# Patient Record
(demographics unavailable — no encounter records)

---

## 2025-04-03 NOTE — HISTORY OF PRESENT ILLNESS
[FreeTextEntry1] : 4/3/25 Pt seen for f/u. She is currently taking Topamax 50 mg daily for headaches. She states that it was working well but for the past few months she is now having increased headaches, about 2-3 weekly. She is taking rizatriptan for rescue and states that it workss great. Denies focal deficits, new vision changes.  Patient states she is getting increased numbness, tingling, pain in her right hand, specifically 4th and 5th finger, radiating up her elbow. She is right handed.  Her arm wakes her up at night. 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 She has had this previously but states it has worsened.  Patient states that she is getting numbness in both her toes. She states she is also getting a painful, burning sensation in her left thigh/groin that radiates down her leg. She endorses low back pain. She has tried conservative treatment previously as well as OTC NSAIDs but states that nothing has helped and it is impacting her greatly as she is a runner, and it is worse when she runs.   Patient is following with Dr. Hdz for her eye complaints in [facility].  She is having a hysterectomy next month   Presenting HPI: This is a 49 y/o RH woman who is being seen in neurologic consultation for headaches and diplopia.  Headache Age of onset: 30s   Location entire right side of head; never on the left  Description Unilateral  Quality- severe pain (cant even touch hair) Cant concentrate or focus during Nausea or vomiting can occur sometimes, not consistent Light or sound sensitivity- not always Rarely lacrimation but usually nose gets stuffy worse with lying down  Warning/aura (premonitory phase)- no warning; can often wake up with it  Postdrome: cognitive slowing, fatigue and neck hurts   Nocturnal awakening- no Associated with exertion or Valsalva- no   Once headache starts- duration: With medication: days but not as severe (Advil and Tylenol can make it bearable) Without medication: can last days   Average number of headache days per week: prior to Topamax daily headaches, now twice a month  Association with menstrual cycle- not sure  Contraceptive use- none  Triggers Headaches worsen in the cold  No consistent food or caffeine trigger   Sleep pattern: 9-10 pm and wakes up at 5 am ([place]- door to door 1 h 10 min)  Water intake: 96 oz   Caffeine intake: rare one cup of coffee   Anxiety/depression/stress: anxiety (not sure if well controlled)  Family History of headaches: none  Personal history of head trauma: none  Preventatives tried: Topiramate Rescue tried: Sumatriptan (made feel terrible due to nausea)  At present she is on Topiramate and pleased with the results. Has mild word recall difficulty. 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 Akins was normal.  Quit smoking two years ago

## 2025-04-03 NOTE — HISTORY OF PRESENT ILLNESS
[FreeTextEntry1] : 4/3/25 Pt seen for f/u. She is currently taking Topamax 50 mg daily for headaches. She states that it was working well but for the past few months she is now having increased headaches, about 2-3 weekly. She is taking rizatriptan for rescue and states that it workss great. Denies focal deficits, new vision changes.  Patient states she is getting increased numbness, tingling, pain in her right hand, specifically 4th and 5th finger, radiating up her elbow. She is right handed.  Her arm wakes her up at night. 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 She has had this previously but states it has worsened.  Patient states that she is getting numbness in both her toes. She states she is also getting a painful, burning sensation in her left thigh/groin that radiates down her leg. She endorses low back pain. She has tried conservative treatment previously as well as OTC NSAIDs but states that nothing has helped and it is impacting her greatly as she is a runner, and it is worse when she runs.   Patient is following with Dr. Hdz for her eye complaints in [facility].  She is having a hysterectomy next month   Presenting HPI: This is a 47 y/o RH woman who is being seen in neurologic consultation for headaches and diplopia.  Headache Age of onset: 30s   Location entire right side of head; never on the left  Description Unilateral  Quality- severe pain (cant even touch hair) Cant concentrate or focus during Nausea or vomiting can occur sometimes, not consistent Light or sound sensitivity- not always Rarely lacrimation but usually nose gets stuffy worse with lying down  Warning/aura (premonitory phase)- no warning; can often wake up with it  Postdrome: cognitive slowing, fatigue and neck hurts   Nocturnal awakening- no Associated with exertion or Valsalva- no   Once headache starts- duration: With medication: days but not as severe (Advil and Tylenol can make it bearable) Without medication: can last days   Average number of headache days per week: prior to Topamax daily headaches, now twice a month  Association with menstrual cycle- not sure  Contraceptive use- none  Triggers Headaches worsen in the cold  No consistent food or caffeine trigger   Sleep pattern: 9-10 pm and wakes up at 5 am ([place] city so [place]- door to door 1 h 10 min)  Water intake: 96 oz   Caffeine intake: rare one cup of coffee   Anxiety/depression/stress: anxiety (not sure if well controlled)  Family History of headaches: none  Personal history of head trauma: none  Preventatives tried: Topiramate Rescue tried: Sumatriptan (made feel terrible due to nausea)  At present she is on Topiramate and pleased with the results. Has mild word recall difficulty. 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 Akins was normal.  Quit smoking two years ago

## 2025-04-03 NOTE — PHYSICAL EXAM
[FreeTextEntry1] : Physical examination  General: No acute distress, Awake, Alert.   Mental status  Awake, alert, and oriented to person, time and place, Normal attention span and concentration, Recent and remote memory intact, Language intact, Fund of knowledge intact.  Cranial Nerves  II: VFF  III, IV, VI: PERRL, EOMI.  V: Facial sensation is normal B/L.  VII: Facial strength is normal B/L.  VIII: Gross hearing is intact.  IX, X: Palate is midline and elevates symmetrically.  XI: Trapezius normal strength.  XII: Tongue midline without atrophy or fasciculations.   Motor exam  Muscle tone - no evidence of rigidity or resistance in all 4 extremities.  No atrophy or fasciculations  Muscle Strength: arms and legs, proximal and distal flexors and extensors are normal  No UE drift.   Sensation Intact sensation to light touch, vibration and cold.   Reflexes  All present, normal, and symmetrical.  Plantars right: mute.  Plantars left: mute.   Coordination  Finger to nose: Normal.  Heel to shin: Normal.   Gait  Normal

## 2025-04-03 NOTE — ASSESSMENT
[FreeTextEntry1] : Increase Topiramate to 50 mg BID Side effects include, but are not limited to, decreased appetite, weight loss, numbness and tingling in the extremities, increased risk of kidney stones, word finding difficulties, and fatigue.  Rescue Continue Rizatriptan PRN  EMG/NCV   OT referral for R hand numbness  MRI L spine WO   Continue HA log and good water intake   f/u in 4 months

## 2025-04-03 NOTE — CONSULT LETTER
[Dear  ___] : Dear ~YAMILEX, [Consult Letter:] : I had the pleasure of evaluating your patient, [unfilled]. [Please see my note below.] : Please see my note below. [FreeTextEntry3] : Sincerely,  Ayaka Snell M.D.

## 2025-06-19 NOTE — HISTORY OF PRESENT ILLNESS
[FreeTextEntry1] : 6/19/25 New complaint- Notes tingling over bridge of nose and moves over the eye and under the eye - pins and needles and numbness and no twitching - no longer on bridge of nose No facial pain  Eye twitches  Notes left chin twitches  people note the mvements  present daily  no temporal association no antecedent infection   Tolerating Topamax  no side effects  Patient states that she is getting numbness in both her toes. She states she is also getting a painful, burning sensation in her left thigh/groin that radiates down her leg. She endorses low back pain. She has tried conservative treatment previously as well as OTC NSAIDs but states that nothing has helped and it is impacting her greatly as she is a runner, and it is worse when she runs.   s/p hysterectomy 7 weeks ago and recovering  4/3/25 Pt seen for f/u. She is currently taking Topamax 50 mg daily for headaches. She states that it was working well but for the past few months she is now having increased headaches, about 2-3 weekly. She is taking rizatriptan for rescue and states that it workss great. Denies focal deficits, new vision changes.  Patient states she is getting increased numbness, tingling, pain in her right hand, specifically 4th and 5th finger, radiating up her elbow. She is right handed.  Her arm wakes her up at night. 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 She has had this previously but states it has worsened.  Patient states that she is getting numbness in both her toes. She states she is also getting a painful, burning sensation in her left thigh/groin that radiates down her leg. She endorses low back pain. She has tried conservative treatment previously as well as OTC NSAIDs but states that nothing has helped and it is impacting her greatly as she is a runner, and it is worse when she runs.   Patient is following with Dr. Hdz for her eye complaints in [facility].  She is having a hysterectomy next month   Presenting HPI: This is a 47 y/o RH woman who is being seen in neurologic consultation for headaches and diplopia.  Headache Age of onset: 30s   Location entire right side of head; never on the left  Description Unilateral  Quality- severe pain (cant even touch hair) Cant concentrate or focus during Nausea or vomiting can occur sometimes, not consistent Light or sound sensitivity- not always Rarely lacrimation but usually nose gets stuffy worse with lying down  Warning/aura (premonitory phase)- no warning; can often wake up with it  Postdrome: cognitive slowing, fatigue and neck hurts   Nocturnal awakening- no Associated with exertion or Valsalva- no   Once headache starts- duration: With medication: days but not as severe (Advil and Tylenol can make it bearable) Without medication: can last days   Average number of headache days per week: prior to Topamax daily headaches, now twice a month  Association with menstrual cycle- not sure  Contraceptive use- none  Triggers Headaches worsen in the cold  No consistent food or caffeine trigger   Sleep pattern: 9-10 pm and wakes up at 5 am ([place]- door to door 1 h 10 min)  Water intake: 96 oz   Caffeine intake: rare one cup of coffee   Anxiety/depression/stress: anxiety (not sure if well controlled)  Family History of headaches: none  Personal history of head trauma: none  Preventatives tried: Topiramate Rescue tried: Sumatriptan (made feel terrible due to nausea)  At present she is on Topiramate and pleased with the results. Has mild word recall difficulty. 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 Akins was normal.  Quit smoking two years ago

## 2025-06-19 NOTE — HISTORY OF PRESENT ILLNESS
[FreeTextEntry1] : 6/19/25 New complaint- Notes tingling over bridge of nose and moves over the eye and under the eye - pins and needles and numbness and no twitching - no longer on bridge of nose No facial pain  Eye twitches  Notes left chin twitches  people note the mvements  present daily  no temporal association no antecedent infection   Tolerating Topamax  no side effects  Patient states that she is getting numbness in both her toes. She states she is also getting a painful, burning sensation in her left thigh/groin that radiates down her leg. She endorses low back pain. She has tried conservative treatment previously as well as OTC NSAIDs but states that nothing has helped and it is impacting her greatly as she is a runner, and it is worse when she runs.   s/p hysterectomy 7 weeks ago and recovering  4/3/25 Pt seen for f/u. She is currently taking Topamax 50 mg daily for headaches. She states that it was working well but for the past few months she is now having increased headaches, about 2-3 weekly. She is taking rizatriptan for rescue and states that it workss great. Denies focal deficits, new vision changes.  Patient states she is getting increased numbness, tingling, pain in her right hand, specifically 4th and 5th finger, radiating up her elbow. She is right handed.  Her arm wakes her up at night. 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 She has had this previously but states it has worsened.  Patient states that she is getting numbness in both her toes. She states she is also getting a painful, burning sensation in her left thigh/groin that radiates down her leg. She endorses low back pain. She has tried conservative treatment previously as well as OTC NSAIDs but states that nothing has helped and it is impacting her greatly as she is a runner, and it is worse when she runs.   Patient is following with Dr. Hdz for her eye complaints in [facility].  She is having a hysterectomy next month   Presenting HPI: This is a 47 y/o RH woman who is being seen in neurologic consultation for headaches and diplopia.  Headache Age of onset: 30s   Location entire right side of head; never on the left  Description Unilateral  Quality- severe pain (cant even touch hair) Cant concentrate or focus during Nausea or vomiting can occur sometimes, not consistent Light or sound sensitivity- not always Rarely lacrimation but usually nose gets stuffy worse with lying down  Warning/aura (premonitory phase)- no warning; can often wake up with it  Postdrome: cognitive slowing, fatigue and neck hurts   Nocturnal awakening- no Associated with exertion or Valsalva- no   Once headache starts- duration: With medication: days but not as severe (Advil and Tylenol can make it bearable) Without medication: can last days   Average number of headache days per week: prior to Topamax daily headaches, now twice a month  Association with menstrual cycle- not sure  Contraceptive use- none  Triggers Headaches worsen in the cold  No consistent food or caffeine trigger   Sleep pattern: 9-10 pm and wakes up at 5 am ([place] so [place]- door to door 1 h 10 min)  Water intake: 96 oz   Caffeine intake: rare one cup of coffee   Anxiety/depression/stress: anxiety (not sure if well controlled)  Family History of headaches: none  Personal history of head trauma: none  Preventatives tried: Topiramate Rescue tried: Sumatriptan (made feel terrible due to nausea)  At present she is on Topiramate and pleased with the results. Has mild word recall difficulty. 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 Akins was normal.  Quit smoking two years ago

## 2025-06-27 NOTE — HISTORY OF PRESENT ILLNESS
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

## 2025-06-27 NOTE — ASSESSMENT
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 If no improvement in 4 to 6 weeks may recommend surgical intervention in the form of cubital tunnel release.  Risk benefits and alternatives of cubital tunnel release were discussed with the patient which included but were not limited to infection bleeding stiffness wound issues and nerve injury.  Patient understands and wishes to proceed with nonoperative management.  If no improvement will return for another surgical discussion -All questions answered, patient in agreement

## 2025-06-27 NOTE — PHYSICAL EXAM
[de-identified] :  General: No acute distress   Respiratory: Nonlabored Cardiology: Warm and well-perfused    Right Upper Extremity   Skin: Intact, no swelling, no ecchymosis,  no intrinsic wasting of hand/first webspace Motor: AIN/PIN/M/R/U all intact;  5/5 FDI, APB Sensory: 2 point discrimination  9 mm over ulnar innervated digits, 7mm over rest Warm and well-perfused, brisk cap refill + Tinel over cubital tunnel + increased numbness with prolonged elbow flexion No Wartenberg sign no instability of ulnar nerve    [de-identified] : [facility] shows ulnar nerve neuropathy with compression at the cubital tunnel